# Patient Record
Sex: FEMALE | Race: WHITE | NOT HISPANIC OR LATINO | Employment: OTHER | URBAN - METROPOLITAN AREA
[De-identification: names, ages, dates, MRNs, and addresses within clinical notes are randomized per-mention and may not be internally consistent; named-entity substitution may affect disease eponyms.]

---

## 2017-11-01 ENCOUNTER — OFFICE VISIT (OUTPATIENT)
Dept: ORTHOPEDIC SURGERY | Facility: CLINIC | Age: 52
End: 2017-11-01

## 2017-11-01 VITALS
WEIGHT: 242 LBS | HEIGHT: 66 IN | DIASTOLIC BLOOD PRESSURE: 84 MMHG | HEART RATE: 67 BPM | BODY MASS INDEX: 38.89 KG/M2 | SYSTOLIC BLOOD PRESSURE: 141 MMHG

## 2017-11-01 DIAGNOSIS — M25.551 RIGHT HIP PAIN: Primary | ICD-10-CM

## 2017-11-01 DIAGNOSIS — M25.512 LEFT SHOULDER PAIN, UNSPECIFIED CHRONICITY: ICD-10-CM

## 2017-11-01 PROCEDURE — 99203 OFFICE O/P NEW LOW 30 MIN: CPT | Performed by: ORTHOPAEDIC SURGERY

## 2017-11-01 RX ORDER — LEVOTHYROXINE SODIUM 0.03 MG/1
TABLET ORAL
Refills: 3 | COMMUNITY
Start: 2017-08-14

## 2017-11-01 NOTE — PROGRESS NOTES
[facility] – [place] [facility] Note    Subjective     Chief Complaint   Patient presents with   • Right Hip - Pain   • Left Shoulder - Pain        HPI    Julia Howard is a 52 y.o. female. She presents today for 2 different problems.  One involves her right hip and the other her left shoulder.  Of the 2 her left shoulder bothers her more.    Left shoulder pain is been present now for 3 months, following no particular injury.  Bothers her when she sleeps on it at night, as well as doing overhead activities.  The patient has been worsening.  The pain is aching, and burning.  Pain is moderate to severe.      Right hip pain has been present now for at least 10 years, following no particular injury.  The pain is moderate to severe, aching in quality, worsens with walking, and has been slowly worsening over the past 10 years.  She occasionally has giving way.      There is no problem list on file for this patient.    History reviewed. No pertinent past medical history.   Past Surgical History:   Procedure Laterality Date   • HYSTERECTOMY  04/2017      Family History   Problem Relation Age of Onset   • No Known Problems Mother    • No Known Problems Father    • No Known Problems Sister    • No Known Problems Brother      Social History     Social History   • Marital status: Unknown     Spouse name: N/A   • Number of children: N/A   • Years of education: N/A     Occupational History   • Not on file.     Social History Main Topics   • Smoking status: Never Smoker   • Smokeless tobacco: Never Used   • Alcohol use Yes   • Drug use: No   • Sexual activity: Defer     Other Topics Concern   • Not on file     Social History Narrative   • No narrative on file      No current outpatient prescriptions on file prior to visit.     No current facility-administered medications on file prior to visit.       No Known Allergies     Review of Systems   Constitutional: Negative for activity change, appetite change, chills, diaphoresis,  "fatigue, fever and unexpected weight change.   HENT: Negative for congestion, dental problem, drooling, ear discharge, ear pain, facial swelling, hearing loss, mouth sores, nosebleeds, postnasal drip, rhinorrhea, sinus pressure, sneezing, sore throat, tinnitus, trouble swallowing and voice change.    Eyes: Negative for photophobia, pain, discharge, redness, itching and visual disturbance.   Respiratory: Negative for apnea, cough, choking, chest tightness, shortness of breath, wheezing and stridor.    Cardiovascular: Negative for chest pain, palpitations and leg swelling.   Gastrointestinal: Negative for abdominal distention, abdominal pain, anal bleeding, blood in stool, constipation, diarrhea, nausea, rectal pain and vomiting.   Endocrine: Negative for cold intolerance, heat intolerance, polydipsia, polyphagia and polyuria.   Genitourinary: Negative for decreased urine volume, difficulty urinating, dysuria, enuresis, flank pain, frequency, genital sores, hematuria and urgency.   Musculoskeletal: Positive for arthralgias. Negative for back pain, gait problem, joint swelling, myalgias, neck pain and neck stiffness.   Skin: Negative for color change, pallor, rash and wound.   Allergic/Immunologic: Negative for environmental allergies, food allergies and immunocompromised state.   Neurological: Negative for dizziness, tremors, seizures, syncope, facial asymmetry, speech difficulty, weakness, light-headedness, numbness and headaches.   Hematological: Negative for adenopathy. Does not bruise/bleed easily.   Psychiatric/Behavioral: Negative for agitation, behavioral problems, confusion, decreased concentration, dysphoric mood, hallucinations, self-injury, sleep disturbance and suicidal ideas. The patient is not nervous/anxious and is not hyperactive.         Objective      Physical Exam  /84  Pulse 67  Ht 66.14\" (168 cm)  Wt 242 lb (110 kg)  BMI 38.89 kg/m2    Body mass index is 38.89 " kg/(m^2).    General:   Mental Status:  Alert   Appearance: Cooperative, in no acute distress   Build and Nutrition: Overweight female   Orientation: Alert and oriented to person, place and time   Posture: Normal   Gait: Normal    Integument:   Left shoulder: No skin lesions, no rash, no ecchymosis    Neurologic:   Sensation:    Left hand: Intact to light touch in the digits   Motor:  Left upper extremity: 5/5 deltoid, biceps, triceps, wrist flexors, wrist extensors, and interossei  Vascular:   Left upper extremity: 2+ dorsalis radial pulse, prompt capillary refill    Upper Extremities:   Left Shoulder:    Tenderness:  None    Swelling:  None    Crepitus: None    Atrophy:  None    Range of motion:  External rotation:  60°       Forward flexion:  170°       Abduction:   160°  Instability:  None  Deformities:  None  Functional testing: Negative drop arm, negative lift-off, positive impingement    Neurologic:   Sensation:    Right foot: Intact to light touch on the dorsal and plantar aspect   Motor:  Right lower extremity: 5/5 quadriceps, hamstrings, ankle dorsiflexors, and ankle plantar flexors    Vascular:   Right lower extremity: 2+ dorsalis pedis pulse, prompt capillary refill    Lower Extremities:   Right Hip:    Tenderness:  None    Swelling: None    Crepitus:  None    Atrophy:  None    Range of motion:  External Rotation: 30°       Internal Rotation: 30°       Flexion:  100°       Extension:  0°   Instability:  None  Deformities:  None  Functional testing:  [facility]    No leg length discrepancy      Imaging/Studies  Imaging Results (last 24 hours)     Procedure Component Value Units Date/Time    XR Shoulder 2+ View Left [[id number]] Resulted:  11/01/17 0900     Updated:  11/01/17 0901    Narrative:       Left Shoulder Radiographs  Indication: left shoulder pain  Views: AP, outlet and axillary views of the left shoulder    Comparison: no prior studies available for review    Findings:  No acute or chronic bony  abnormalities seen, with normal alignment.      XR Hip With or Without Pelvis 1 View Right [[id number]] Resulted:  11/01/17 0901     Updated:  11/01/17 0902    Narrative:       Right Hip Radiographs  Indication: right hip pain  Views: low AP pelvis and lateral of the right hip    Comparison: no prior studies available for review    Findings:   No acute bony abnormalities.  Uncovering of the femoral head laterally,   with mild arthritic changes.            Assessment and Plan     Julia was seen today for pain and pain.    Diagnoses and all orders for this visit:    Right hip pain  -     XR Hip With or Without Pelvis 1 View Right    Left shoulder pain, unspecified chronicity  -     XR Shoulder 2+ View Left        I reviewed my findings with patient today.  Regarding her left shoulder, she has signs of impingement, and no improvement with physical therapy.  At this point, recommended an MRI, specifically looking at the rotator cuff.  I will see her back after the MRI, but sooner for any problems.    With regards to her right hip, she does have signs of arthritis, and I've recommended continued conservative treatment.  If her pain worsens future, we may consider an intra-articular injection.  We may also consider hip replacement surgery in the future if she has worsening symptoms.    No Follow-up on file.      Medical Decision Making    Data/Risk: radiology tests and independent visualization of imaging, lab tests, or EMG/NCV      Wayne Danielle MD  11/01/17  9:25 AM

## 2017-11-13 ENCOUNTER — TELEPHONE (OUTPATIENT)
Dept: ORTHOPEDIC SURGERY | Facility: CLINIC | Age: 52
End: 2017-11-13

## 2017-11-13 DIAGNOSIS — G89.29 CHRONIC LEFT SHOULDER PAIN: Primary | ICD-10-CM

## 2017-11-13 DIAGNOSIS — M25.512 CHRONIC LEFT SHOULDER PAIN: Primary | ICD-10-CM

## 2017-11-13 NOTE — TELEPHONE ENCOUNTER
You recommended pt to get MRI in last OV, however, no order was placed. Please put order in system. Thanks!

## 2017-11-13 NOTE — TELEPHONE ENCOUNTER
PATIENT CALLED IN REGARDS TO A MRI SHE WAS TO HAVE SCHEDULED.   I DO NOT SEE WHERE THERE WAS AN ORDER PLACED FOR MRI, WAS PATIENT TO HAVE MRI?  IF NOT PLEASE CALL PATIENT AND DISCUSS AS SHE THOUGHT SHE WAS TO HAVE.  HER NUMBER IS [phone]

## 2017-11-14 ENCOUNTER — HOSPITAL ENCOUNTER (OUTPATIENT)
Dept: MRI IMAGING | Facility: HOSPITAL | Age: 52
Discharge: HOME | End: 2017-11-14
Attending: ORTHOPAEDIC SURGERY | Admitting: ORTHOPAEDIC SURGERY

## 2017-11-14 DIAGNOSIS — M25.512 CHRONIC LEFT SHOULDER PAIN: ICD-10-CM

## 2017-11-14 DIAGNOSIS — G89.29 CHRONIC LEFT SHOULDER PAIN: ICD-10-CM

## 2017-11-14 PROCEDURE — 73221 MRI JOINT UPR EXTREM W/O DYE: CPT

## 2017-11-15 ENCOUNTER — OFFICE VISIT (OUTPATIENT)
Dept: ORTHOPEDIC SURGERY | Facility: CLINIC | Age: 52
End: 2017-11-15

## 2017-11-15 VITALS
HEART RATE: 74 BPM | HEIGHT: 66 IN | DIASTOLIC BLOOD PRESSURE: 75 MMHG | BODY MASS INDEX: 38.89 KG/M2 | WEIGHT: 242 LBS | SYSTOLIC BLOOD PRESSURE: 134 MMHG

## 2017-11-15 DIAGNOSIS — IMO0002 ROTATOR CUFF SYNDROME OF LEFT SHOULDER AND ALLIED DISORDERS: Primary | ICD-10-CM

## 2017-11-15 PROCEDURE — 20610 DRAIN/INJ JOINT/BURSA W/O US: CPT | Performed by: ORTHOPAEDIC SURGERY

## 2017-11-15 PROCEDURE — 99214 OFFICE O/P EST MOD 30 MIN: CPT | Performed by: ORTHOPAEDIC SURGERY

## 2017-11-15 RX ORDER — LIDOCAINE HYDROCHLORIDE 10 MG/ML
4 INJECTION, SOLUTION INFILTRATION; PERINEURAL
Status: COMPLETED | OUTPATIENT
Start: 2017-11-15 | End: 2017-11-15

## 2017-11-15 RX ORDER — TRIAMCINOLONE ACETONIDE 40 MG/ML
40 INJECTION, SUSPENSION INTRA-ARTICULAR; INTRAMUSCULAR
Status: COMPLETED | OUTPATIENT
Start: 2017-11-15 | End: 2017-11-15

## 2017-11-15 RX ADMIN — LIDOCAINE HYDROCHLORIDE 4 ML: 10 INJECTION, SOLUTION INFILTRATION; PERINEURAL at 15:20

## 2017-11-15 RX ADMIN — TRIAMCINOLONE ACETONIDE 40 MG: 40 INJECTION, SUSPENSION INTRA-ARTICULAR; INTRAMUSCULAR at 15:20

## 2017-11-15 NOTE — PROGRESS NOTES
[facility] [place] [facility] [place]    Subjective     Chief Complaint   Patient presents with   • Left Shoulder - Follow-up     Post MRI         HPI    Julia Howard is a 52 y.o. female. Follows up today for her MRI of the left shoulder.  New complaints today.  Pain is moderate in severity, aching in quality, unchanged from her last visit.      There is no problem list on file for this patient.    History reviewed. No pertinent past medical history.   Past Surgical History:   Procedure Laterality Date   • HYSTERECTOMY  04/2017      Family History   Problem Relation Age of Onset   • No Known Problems Mother    • No Known Problems Father    • No Known Problems Sister    • No Known Problems Brother      Social History     Social History   • Marital status: Unknown     Spouse name: N/A   • Number of children: N/A   • Years of education: N/A     Occupational History   • Not on file.     Social History Main Topics   • Smoking status: Never Smoker   • Smokeless tobacco: Never Used   • Alcohol use Yes   • Drug use: No   • Sexual activity: Defer     Other Topics Concern   • Not on file     Social History Narrative      Current Outpatient Prescriptions on File Prior to Visit   Medication Sig Dispense Refill   • levothyroxine (SYNTHROID, LEVOTHROID) 25 MCG tablet TAKE 1 TABLET DAILY AS DIRECTED.  3     No current facility-administered medications on file prior to visit.       No Known Allergies     Review of Systems   Constitutional: Negative for activity change, appetite change, chills, diaphoresis, fatigue, fever and unexpected weight change.   HENT: Negative for congestion, dental problem, drooling, ear discharge, ear pain, facial swelling, hearing loss, mouth sores, nosebleeds, postnasal drip, rhinorrhea, sinus pressure, sneezing, sore throat, tinnitus, trouble swallowing and voice change.    Eyes: Negative for photophobia, pain, discharge, redness, itching and visual disturbance.   Respiratory: Negative for apnea,  "cough, choking, chest tightness, shortness of breath, wheezing and stridor.    Cardiovascular: Negative for chest pain, palpitations and leg swelling.   Gastrointestinal: Negative for abdominal distention, abdominal pain, anal bleeding, blood in stool, constipation, diarrhea, nausea, rectal pain and vomiting.   Endocrine: Negative for cold intolerance, heat intolerance, polydipsia, polyphagia and polyuria.   Genitourinary: Negative for decreased urine volume, difficulty urinating, dysuria, enuresis, flank pain, frequency, genital sores, hematuria and urgency.   Musculoskeletal: Negative for arthralgias, back pain, gait problem, joint swelling, myalgias, neck pain and neck stiffness.        Joint Pain    Skin: Negative for color change, pallor, rash and wound.   Allergic/Immunologic: Negative for environmental allergies, food allergies and immunocompromised state.   Neurological: Negative for dizziness, tremors, seizures, syncope, facial asymmetry, speech difficulty, weakness, light-headedness, numbness and headaches.   Hematological: Negative for adenopathy. Does not bruise/bleed easily.   Psychiatric/Behavioral: Negative for agitation, behavioral problems, confusion, decreased concentration, dysphoric mood, hallucinations, self-injury, sleep disturbance and suicidal ideas. The patient is not nervous/anxious and is not hyperactive.         Objective      Physical Exam  /75  Pulse 74  Ht 66.14\" (168 cm)  Wt 242 lb (110 kg)  BMI 38.89 kg/m2    Body mass index is 38.89 kg/(m^2).    General:   Mental Status:  Alert   Appearance: Cooperative, in no acute distress   Build and Nutrition: Well-nourished and well developed female   Orientation: Alert and oriented to person, place and time   Posture: Normal   Gait: Normal    Integument:   Left shoulder: No skin lesions, no rash, no ecchymosis    Upper Extremities:   Left Shoulder:    Tenderness:  None    Swelling:  None    Range of motion:  External rotation: "  60°       Forward flexion:  160°       Abduction:   160°  Deformities:  None  Functional testing: Negative drop arm, negative lift-off, positive impingement      Imaging/Studies  MRI left shoulder:  IMPRESSION:  1. There is full-thickness abnormal signal indicating tear and  degeneration of the rotator cuff tendons combined with a small rim rent  tear at the attachment of the rotator cuff tendons on the greater  tuberosity. This is substantial full-thickness disease of the rotator  cuff without cuff retraction.  2. Biceps anchor, superior labrum, anterior labrum and posterior labrum  are intact.  3. Inflammatory arthropathy left AC joint is noted without high-grade  superior arch impingement.  4. Other findings as noted above.      Assessment and Plan     Julia was seen today for follow-up.    Diagnoses and all orders for this visit:    Rotator cuff syndrome of left shoulder and allied disorders  -     Ambulatory Referral to Physical Therapy Evaluate and treat  -     Large Joint Arthrocentesis        I reviewed my findings with patient today.  Her left shoulder continues to bother her, and her MRI seems to suggest small rotator cuff tear.  I have offered her a trial of a subacromial injection and exercises first.  Should she have no long-term improvement, she may be a candidate for surgical intervention.  I will see her back in 2 months, but sooner for any problems.    She had 60% relief just a few minutes following the injection.    Return in about 2 months (around 1/15/2018).      Medical Decision Making  Management Options : prescription/IM medicine and physical/occupational therapy  Data/Risk: independent visualization of imaging, lab tests, or EMG/NCV      Wayne Danielle MD  11/15/17  3:30 PM

## 2017-11-15 NOTE — PROGRESS NOTES
Procedure   Large Joint Arthrocentesis  Date/Time: 11/15/2017 3:20 PM  Consent given by: patient  Site marked: site marked  Timeout: Immediately prior to procedure a time out was called to verify the correct patient, procedure, equipment, support staff and site/side marked as required   Supporting Documentation  Indications: pain   Procedure Details  Location: shoulder - L subacromial bursa  Needle size: 22 G  Approach: posterior  Medications administered: 4 mL lidocaine 1 %; 40 mg triamcinolone acetonide 40 MG/ML

## 2017-11-20 ENCOUNTER — HOSPITAL ENCOUNTER (OUTPATIENT)
Dept: PHYSICAL THERAPY | Facility: HOSPITAL | Age: 52
Setting detail: THERAPIES SERIES
Discharge: HOME | End: 2017-11-20
Attending: ORTHOPAEDIC SURGERY

## 2017-11-20 DIAGNOSIS — M25.512 ACUTE PAIN OF LEFT SHOULDER: Primary | ICD-10-CM

## 2017-11-20 PROCEDURE — 97110 THERAPEUTIC EXERCISES: CPT | Performed by: PHYSICAL THERAPIST

## 2017-11-20 PROCEDURE — 97161 PT EVAL LOW COMPLEX 20 MIN: CPT | Performed by: PHYSICAL THERAPIST

## 2017-11-20 NOTE — THERAPY EVALUATION
"    Outpatient Physical Therapy Ortho Initial Evaluation  [facility]     Patient Name: Julia Howard  : 1965  MRN: [id number]  Today's Date: 2017      Visit Date: 2017    There is no problem list on file for this patient.       No past medical history on file.     Past Surgical History:   Procedure Laterality Date   • HYSTERECTOMY  2017       Visit Dx:     ICD-10-CM ICD-9-CM   1. Acute pain of left shoulder M25.512 719.41             Patient History       17 1500          History    Chief Complaint Pain  -ALFREDO      Type of Pain Shoulder pain  -ALFREDO      Date Current Problem(s) Began 17  -ALFREDO      Brief Description of Current Complaint Patient reports that she has a more recent onset of left shoulder pain of unknown cause.  Notes the pain has been present approximately 2 months.  She states that she sleeps on her left side frequently with her arm above her which she attributes as a possible source of the pain.  Pain is more along the superior shoulder and is generally \"okay\" throughout the day but is worse when laying down at night.  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  hobbies: recreational exercise  -ALFREDO      How has patient tried to help current problem? injection, rest  -ALFREDO      What clinical tests have you had for this problem? X-ray  -ALFREDO      Results of Clinical Tests partial vs full thickness tear of the supraspinatus  -ALFREDO      Pain     Pain Location Shoulder  -ALFREDO      Pain at Present 0  -ALFREDO      Pain at Best 0  -ALFREDO      Pain at Worst 6   8 prior to injection  -ALFREDO      Pain Frequency Intermittent  -ALFREDO      Pain Description Dull;Discomfort;Sore  -ALFREDO      What Performance Factors Make the Current Problem(s) WORSE? laying on the shoulder " in flexed position  -ALFREDO      What Performance Factors Make the Current Problem(s) BETTER? rest and avoidance  -ALFREDO      Difficulties at work? none  -ALFREDO      Difficulties with ADL's? upper body dressing, haircare  -ALFREDO      Difficulties with recreational activities? recreational exercise  -ALFREDO      Fall Risk Assessment    Any falls in the past year: No  -ALFREDO      Daily Activities    Primary Language English  -ALFREDO      Pt Participated in POC and Goals Yes  -ALFREDO      Safety    Are you being hurt, hit, or frightened by anyone at home or in your life? No  -ALFREDO        User Key  (r) = Recorded By, (t) = Taken By, (c) = Cosigned By    Initials Name Provider Type    ALFREDO Yasmani Siu, PT Physical Therapist                PT Ortho       11/20/17 1600    Posture/Observations    Posture/Observations Comments fwd head posture with mild rounded shoulders.  -ALFREDO    Special Tests/Palpation    Special Tests/Palpation Shoulder  -ALFREDO    Shoulder Girdle Palpation    Subacromial Space Left:;Tender  -ALFREDO    Supraspinatus Insertion --   nontender  -ALFREDO    Long Head of Biceps Left:;Guarded/taut  -ALFREDO    Upper Trap Bilateral:;Guarded/taut  -ALFREDO    Shoulder Girdle Palpation? Yes  -ALFREDO    Shoulder Impingement/Rotator Cuff Special Tests    Goldberg-Gamaliel Test (RC Lesion vs. Bursitis) Left:;Negative  -ALFREDO    Neer Impingement Test (RC Lesion vs. Bursitis) Left:;Positive  -ALFREDO    Full Can Test (RC Lesion) Left:;Positive   weak and painful  -ALFREDO    Drop Arm Test (Full Thickness RC Lesion) Negative  -ALFREDO    Shoulder Laxity/Instability Special Tests    Posterior Drawer Sign Negative  -ALFREDO    Biceps/Labral Special Tests    Harlingen's Test (Labral Test) Left:;Positive  -ALFREDO    Biceps II (Biceps Tendinopathy vs. Labral Tear) Negative  -ALFREDO    ROM (Range of Motion)    General ROM Detail left shoulder flexion 135, . Pain above 100 for both.  Hand behind head to C7 with pain, c3/4 without, HBB to L1 without pain  -ALFREDO    MMT (Manual Muscle Testing)    General MMT  Assessment upper extremity strength deficits identified  -ALFREDO    Left Shoulder    Flexion Gross Movement (4/5) good   pain  -ALFREDO    Abduction Middle Deltoid (4+/5) good plus  -ALFREDO    Abduction Supraspinatus (3+/5) fair plus   pain  -ALFREDO    Int Rotation Gross Movement (5/5) normal  -ALFREDO    Ext Rotation Gross Movement (3+/5) fair plus   pain  -ALFREDO    External Rotation Infraspinatus (4+/5) good plus   less painful  -ALFREDO    Left Scapula    Scapular ADduction Middle Trapezius (4/5) good  -ALFREDO    Scapular ADd&Depression Lwr Trapezius (3/5) fair  -ALFREDO    Upper Extremity    Upper Ext Manual Muscle Testing left shoulder strength deficit;left scapular strength deficit;left elbow/forearm strength deficit  -ALFREDO    Left Elbow/Forearm    Elbow Flexion Gross Movement (5/5) normal  -ALFREDO    Triceps Brachii Elbow Extension (5/5) normal  -ALFREDO      User Key  (r) = Recorded By, (t) = Taken By, (c) = Cosigned By    Initials Name Provider Type    ALFREDO Siu, PT Physical Therapist                            Therapy Education       11/20/17 1622          Therapy Education    Education Details initiated HEP per exercise flowsheet  -ALFREDO      Given HEP  -ALFREDO      Program New  -ALFREDO      How Provided Demonstration;Written;Verbal  -ALFREDO      Provided to Patient  -ALFREDO      Level of Understanding Verbalized;Demonstrated  -ALFREDO        User Key  (r) = Recorded By, (t) = Taken By, (c) = Cosigned By    Initials Name Provider Type    ALFREDO Siu, PT Physical Therapist                PT OP Goals       11/20/17 1600       PT Short Term Goals    STG Date to Achieve 12/04/17  -ALFREDO     STG 1 Patient to maintain pn 0/10 pre treatment  -ALFREDO     STG 1 Progress New  -ALFREDO     STG 2 Patient to acheive hand hebhind head to C7 with minimal pain  -ALFREDO     STG 2 Progress New  -ALFREDO     Long Term Goals    LTG Date to Achieve 12/18/17  -ALFREDO     LTG 1 Patient to be independent with final HEP  -ALFREDO     LTG 1 Progress New  -ALFREDO     LTG 2 Patient to demonstrate middle trap strength at  least 4+/5  -ALFREDO     LTG 2 Progress New  -ALFREDO     LTG 3 Patient to demonstrate pain free ABD to at least 125  -ALFREDO     LTG 3 Progress New  -ALFREDO     LTG 4 Patient to demonstrate pain free flexion to at least 125  -ALFREDO     LTG 4 Progress New  -ALFREDO     LTG 5 Patient to improve Quick Dash to at least 16%  -ALFREDO     LTG 5 Progress New  -ALFREDO     Time Calculation    PT Goal Re-Cert Due Date 02/18/18  -ALFREDO       User Key  (r) = Recorded By, (t) = Taken By, (c) = Cosigned By    Initials Name Provider Type    ALFREDO Siu, PT Physical Therapist                PT Assessment/Plan       11/20/17 1600       PT Assessment    Functional Limitations Performance in self-care ADL;Performance in leisure activities;Limitation in home management  -ALFREDO     Impairments Range of motion;Posture;Poor body mechanics;Pain;Muscle strength  -ALFREDO     Assessment Comments Patient presents with insidious left shoulder pain with clinical and MRI signs of partial thickness RTC tear.  She demonstrates near functional but painful ROM and irritatbility in shoulder elevated positions.  Pain interferes with ADLs and home management as well as leisure activities and have generally improved with injection last week.  -ALFREDO     Please refer to paper survey for additional self-reported information Yes  -ALFREDO     Rehab Potential Good  -ALFREDO     Patient/caregiver participated in establishment of treatment plan and goals Yes  -ALFREDO     Patient would benefit from skilled therapy intervention Yes  -ALFREDO     PT Plan    PT Frequency 2x/week  -ALFREDO     Predicted Duration of Therapy Intervention (days/wks) 8 visits  -ALFREDO     Planned CPT's? PT EVAL LOW COMPLEXITY: 90583;PT THER PROC EA 15 MIN: 06500;PT MANUAL THERAPY EA 15 MIN: 68852;PT ELECTRICAL STIM UNATTEND: ;PT ULTRASOUND EA 15 MIN: 20841;PT HOT/COLD PACK WC NONMCARE: 40048  -ALFREDO     PT Plan Comments postural exercises, RTC/Deltoid strengthening, scapular stabilization, modalities and manual techniques as indicated. Modalities prn.   -ALFREDO       User Key  (r) = Recorded By, (t) = Taken By, (c) = Cosigned By    Initials Name Provider Type    ALFREDO Siu, PT Physical Therapist                  Exercises       11/20/17 1600          Exercise 2    Exercise Name 2 supine wand flexion  -ALFREDO      Reps 2 10  -ALFREDO      Exercise 3    Exercise Name 3 S/L sleeper stretch  -ALFREDO      Sets 3 3  -ALFREDO      Time (Seconds) 3 30  -ALFREDO      Exercise 4    Exercise Name 4 S/L cross arm add stretch  -ALFREDO      Sets 4 3  -ALFREDO      Time (Seconds) 4 30  -ALFREDO        User Key  (r) = Recorded By, (t) = Taken By, (c) = Cosigned By    Initials Name Provider Type    ALFREDO Siu, PT Physical Therapist                              Time Calculation:   Start Time: 1500  Total Timed Code Minutes- PT: 11 minute(s)     Therapy Charges for Today     Code Description Service Date Service Provider Modifiers Qty    25881543888  PT EVAL LOW COMPLEXITY 3 11/20/2017 Yasmani Siu, PT GP 1    [id number]  PT THER PROC EA 15 MIN 11/20/2017 Yasmani Siu, PT GP 1                    Yasmani Siu, PT  11/20/2017

## 2017-11-27 ENCOUNTER — HOSPITAL ENCOUNTER (OUTPATIENT)
Dept: PHYSICAL THERAPY | Facility: HOSPITAL | Age: 52
Setting detail: THERAPIES SERIES
Discharge: HOME | End: 2017-11-27
Attending: ORTHOPAEDIC SURGERY

## 2017-11-27 DIAGNOSIS — M25.512 ACUTE PAIN OF LEFT SHOULDER: Primary | ICD-10-CM

## 2017-11-27 PROCEDURE — 97110 THERAPEUTIC EXERCISES: CPT | Performed by: PHYSICAL THERAPIST

## 2017-11-27 NOTE — THERAPY TREATMENT NOTE
Outpatient Physical Therapy Ortho Treatment Note  [facility]     Patient Name: Julia Howard  : 1965  MRN: [id number]  Today's Date: 2017      Visit Date: 2017    Visit Dx:    ICD-10-CM ICD-9-CM   1. Acute pain of left shoulder M25.512 719.41       There is no problem list on file for this patient.       No past medical history on file.     Past Surgical History:   Procedure Laterality Date   • HYSTERECTOMY  2017                             PT Assessment/Plan       17 1700       PT Assessment    Assessment Comments Patient tolerates all exercises without increased pain.  -ALFREDO     PT Plan    PT Plan Comments progress as tolerated, attempt exercise class to modify.  -ALFREDO       User Key  (r) = Recorded By, (t) = Taken By, (c) = Cosigned By    Initials Name Provider Type    ALFREDO Siu, PT Physical Therapist                    Exercises       17 1700          Subjective Comments    Subjective Comments She states that she continues to feel pretty well.  She continues to have pain when in certain positions  -ALFREDO      Subjective Pain    Able to rate subjective pain? yes  -ALFREDO      Pre-Treatment Pain Level 0  -ALFREDO      Post-Treatment Pain Level 0  -ALFREDO      Exercise 3    Exercise Name 3 S/L sleeper stretch  -ALFREDO      Sets 3 3  -ALFREDO      Time (Seconds) 3 30  -ALFREDO      Exercise 4    Exercise Name 4 S/L cross arm add stretch  -ALFREDO      Sets 4 3  -ALFREDO      Time (Seconds) 4 30  -ALFREDO      Exercise 5    Exercise Name 5 middle/low rows red band  -ALFREDO      Reps 5 15  -ALFREDO      Exercise 6    Exercise Name 6 no money (no bands due to pain)  -ALFREDO      Reps 6 15  -ALFREDO      Exercise 7    Exercise Name 7 band ER/IR/flex/ext  -ALFREDO      Sets 7 2  -ALFREDO      Reps 7 12  -ALFREDO      Exercise 8    Exercise Name 8 full can red band  -ALFREDO      Reps 8 12  -ALFREDO        User Key  (r) = Recorded By, (t) = Taken By, (c) = Cosigned By    Initials Name Provider Type    ALFREDO Siu, PT Physical Therapist                                    Therapy Education       11/27/17 4333          Therapy Education    Education Details added band exercises to HEP per flowsheet  -ALFREDO      Given HEP  -ALFREDO      Program New  -ALFREDO      How Provided Verbal;Demonstration;Written  -ALFREDO      Provided to Patient  -ALFREDO      Level of Understanding Verbalized;Demonstrated  -ALFREDO        User Key  (r) = Recorded By, (t) = Taken By, (c) = Cosigned By    Initials Name Provider Type    ALFREDO Siu, PT Physical Therapist                Time Calculation:   Start Time: 1645  Total Timed Code Minutes- PT: 30 minute(s)    Therapy Charges for Today     Code Description Service Date Service Provider Modifiers Qty    66428415410  PT THER PROC EA 15 MIN 11/27/2017 Yasmani Siu, PT GP 2                    Yasmani Siu, PT  11/27/2017

## 2017-12-05 ENCOUNTER — HOSPITAL ENCOUNTER (OUTPATIENT)
Dept: PHYSICAL THERAPY | Facility: HOSPITAL | Age: 52
Setting detail: THERAPIES SERIES
Discharge: HOME | End: 2017-12-05
Attending: ORTHOPAEDIC SURGERY

## 2017-12-05 DIAGNOSIS — M25.512 ACUTE PAIN OF LEFT SHOULDER: Primary | ICD-10-CM

## 2017-12-05 PROCEDURE — 97110 THERAPEUTIC EXERCISES: CPT | Performed by: PHYSICAL THERAPIST

## 2017-12-05 NOTE — THERAPY TREATMENT NOTE
Outpatient Physical Therapy Ortho Treatment Note  [facility]     Patient Name: Julia Howard  : 1965  MRN: [id number]  Today's Date: 2017      Visit Date: 2017    Visit Dx:    ICD-10-CM ICD-9-CM   1. Acute pain of left shoulder M25.512 719.41       There is no problem list on file for this patient.       No past medical history on file.     Past Surgical History:   Procedure Laterality Date   • HYSTERECTOMY  2017                             PT Assessment/Plan       17 0800       PT Assessment    Assessment Comments Patient is able to replicate gym activities without pain.  She is presenting with much reduced pain throughout and between sessions.  -ALFREDO     PT Plan    PT Plan Comments decrease frequency 1x/wk and continue with functional progression.  -ALFREDO       User Key  (r) = Recorded By, (t) = Taken By, (c) = Cosigned By    Initials Name Provider Type    ALFREDO Siu, PT Physical Therapist                    Exercises       17 0800          Subjective Comments    Subjective Comments She states that her shoulder has been doing well, she has been really busy and hasn't noticed a lot of issues.  -ALFREDO      Subjective Pain    Able to rate subjective pain? yes  -ALFREDO      Pre-Treatment Pain Level 0  -ALFREDO      Post-Treatment Pain Level 0  -ALFREDO      Exercise 2    Exercise Name 2 plank review: low/high  -ALFREDO      Time (Minutes) 2 5  -ALFREDO      Exercise 3    Exercise Name 3 shoulder ER at 70 ABD  -ALFREDO      Reps 3 10  -ALFREDO      Exercise 5    Exercise Name 5 middle/low rows at CC 4 pl  -ALFREDO      Reps 5 12  -ALFREDO      Exercise 6    Exercise Name 6 lat pull at CC 4pl  -ALFREDO      Reps 6 12  -ALFREDO      Exercise 7    Exercise Name 7 wall plank with scapular retraction  -ALFREDO      Sets 7 2  -ALFREDO      Reps 7 10  -ALFREDO      Exercise 8    Exercise Name 8 bent over row 5#  -ALFREDO      Reps 8 12  -ALFREDO        User Key  (r) = Recorded By, (t) = Taken By, (c) = Cosigned By    Initials Name Provider Type    ALFREDO SNYDER  Salbador, PT Physical Therapist                                       Time Calculation:   Start Time: 0805  Total Timed Code Minutes- PT: 40 minute(s)    Therapy Charges for Today     Code Description Service Date Service Provider Modifiers Qty    [id number]  PT THER PROC EA 15 MIN 12/5/2017 Yasmani Siu, PT GP 3                    Yasmani Siu, PT  12/5/2017

## 2017-12-11 ENCOUNTER — HOSPITAL ENCOUNTER (OUTPATIENT)
Dept: PHYSICAL THERAPY | Facility: HOSPITAL | Age: 52
Setting detail: THERAPIES SERIES
Discharge: HOME | End: 2017-12-11
Attending: ORTHOPAEDIC SURGERY

## 2017-12-11 DIAGNOSIS — M25.512 ACUTE PAIN OF LEFT SHOULDER: Primary | ICD-10-CM

## 2017-12-11 PROCEDURE — 97110 THERAPEUTIC EXERCISES: CPT | Performed by: PHYSICAL THERAPIST

## 2017-12-11 NOTE — THERAPY TREATMENT NOTE
Outpatient Physical Therapy Ortho Treatment Note  [facility]     Patient Name: Julia Howard  : 1965  MRN: [id number]  Today's Date: 2017      Visit Date: 2017    Visit Dx:    ICD-10-CM ICD-9-CM   1. Acute pain of left shoulder M25.512 719.41       There is no problem list on file for this patient.       No past medical history on file.     Past Surgical History:   Procedure Laterality Date   • HYSTERECTOMY  2017                             PT Assessment/Plan       17 1800       PT Assessment    Assessment Comments Patient is challeneged with shoulder stability in elevated positions.  She is demonstrating improved pain and irritability.  -ALFREDO     PT Plan    PT Plan Comments add low trap slides facing wall with band  -ALFREDO       User Key  (r) = Recorded By, (t) = Taken By, (c) = Cosigned By    Initials Name Provider Type    ALFREDO Siu, PT Physical Therapist                    Exercises       17 1500          Subjective Comments    Subjective Comments She states that she is feeling great.  She went to a cycle class today and wasn't limited.  -ALFREDO      Subjective Pain    Able to rate subjective pain? yes  -ALFREDO      Pre-Treatment Pain Level 0  -ALFREDO      Exercise 1    Exercise Name 1 UBE 1.0  -ALFREDO      Time (Minutes) 1 4  -ALFREDO      Exercise 2    Exercise Name 2 ball drills: scaption dribble at 90, circles CW/CCW at 90 flex, RS at end range elevation/abd, CW/CCW cirlces a 90 ABD  -ALFREDO      Time (Minutes) 2 12  -ALFREDO      Exercise 3    Exercise Name 3 body blade flex/abd/oblique, bilateral in flexion to fatigue each x 3  -ALFREDO      Exercise 4    Exercise Name 4 robbery at 90 ABD yellow  -ALFREDO      Reps 4 15  -ALFREDO      Exercise 5    Exercise Name 5 middle/low rows at CC 4 pl  -ALFREDO      Reps 5 15  -ALFREDO      Exercise 7    Exercise Name 7 wall plank with scapular retraction  -ALFREDO      Sets 7 2  -ALFREDO      Reps 7 10  -ALFREDO        User Key  (r) = Recorded By, (t) = Taken By, (c) = Cosigned By     Initials Name Provider Type    ALFREDO Yasmani Siu, PT Physical Therapist                                       Time Calculation:   Start Time: 1544  Total Timed Code Minutes- PT: 39 minute(s)    Therapy Charges for Today     Code Description Service Date Service Provider Modifiers Qty    54944871255 HC PT THER PROC EA 15 MIN 12/11/2017 Yasmani Siu, PT GP 3                    Yasmani Siu, PT  12/11/2017

## 2017-12-21 ENCOUNTER — HOSPITAL ENCOUNTER (OUTPATIENT)
Dept: PHYSICAL THERAPY | Facility: HOSPITAL | Age: 52
Setting detail: THERAPIES SERIES
Discharge: HOME | End: 2017-12-21
Attending: ORTHOPAEDIC SURGERY

## 2017-12-21 DIAGNOSIS — M25.512 ACUTE PAIN OF LEFT SHOULDER: Primary | ICD-10-CM

## 2017-12-21 PROCEDURE — 97110 THERAPEUTIC EXERCISES: CPT | Performed by: PHYSICAL THERAPIST

## 2017-12-21 NOTE — THERAPY PROGRESS REPORT/RE-CERT
Outpatient Physical Therapy Ortho Re-Assessment  [facility]     Patient Name: Julia Howard  : 1965  MRN: [id number]  Today's Date: 2017      Visit Date: 2017    There is no problem list on file for this patient.       No past medical history on file.     Past Surgical History:   Procedure Laterality Date   • HYSTERECTOMY  2017       Visit Dx:     ICD-10-CM ICD-9-CM   1. Acute pain of left shoulder M25.512 719.41                 PT Ortho       17 1600    Posture/Observations    Posture/Observations Comments fwd head posture with mild rounded shoulders.  -ALFREDO    Shoulder Impingement/Rotator Cuff Special Tests    Goldberg-Gamaliel Test (RC Lesion vs. Bursitis) Negative  -ALFREDO    Neer Impingement Test (RC Lesion vs. Bursitis) Negative  -ALFREDO    Full Can Test (RC Lesion) Negative  -ALFREDO    Left Shoulder    Flexion Gross Movement (4+/5) good plus  -ALRFEDO    Abduction Middle Deltoid (4+/5) good plus  -ALFREDO    Abduction Supraspinatus (4+/5) good plus  -ALFREDO    Int Rotation Gross Movement (5/5) normal  -ALFREDO    Ext Rotation Gross Movement (4+/5) good plus  -ALFREDO    External Rotation Infraspinatus (4+/5) good plus  -ALFREDO    Left Scapula    Scapular ADduction Middle Trapezius (4+/5) good plus  -ALFREDO    Scapular ADd&Depression Lwr Trapezius (4/5) good  -ALFREDO      User Key  (r) = Recorded By, (t) = Taken By, (c) = Cosigned By    Initials Name Provider Type    ALFREDO Siu, PT Physical Therapist                                  PT OP Goals       17 1600       PT Short Term Goals    STG Date to Achieve 17  -ALFREDO     STG 1 Patient to maintain pn 0/10 pre treatment  -ALFREDO     STG 1 Progress Met  -ALFREDO     STG 2 Patient to acheive hand hebhind head to C7 with minimal pain  -ALFREDO     STG 2 Progress Met  -ALFREDO     Long Term Goals    LTG Date to Achieve 17  -ALFREDO     LTG 1 Patient to be independent with final HEP  -ALFREDO     LTG 1 Progress Met  -ALFREDO     LTG 2 Patient to demonstrate middle trap strength at least 4+/5   -ALFREDO     LTG 2 Progress Partially Met  -ALFREDO     LTG 3 Patient to demonstrate pain free ABD to at least 125  -ALFREDO     LTG 3 Progress Met  -ALFREDO     LTG 4 Patient to demonstrate pain free flexion to at least 125  -ALFREDO     LTG 4 Progress Met  -ALFREDO     LTG 5 Patient to improve Quick Dash to at least 16%  -ALFREDO     LTG 5 Progress Met  -ALFREDO       User Key  (r) = Recorded By, (t) = Taken By, (c) = Cosigned By    Initials Name Provider Type    ALFREDO Siu, PT Physical Therapist                PT Assessment/Plan       12/21/17 1600       PT Assessment    Assessment Comments At this time the patient has met all of her goals and is appropriate to transition into I HEP.  -ALFREDO     Please refer to paper survey for additional self-reported information Yes  -ALFREDO     Rehab Potential Good  -ALFREDO     Patient/caregiver participated in establishment of treatment plan and goals Yes  -ALFREDO     Patient would benefit from skilled therapy intervention Yes  -ALFREDO     PT Plan    Predicted Duration of Therapy Intervention (days/wks) 6 visits  -ALFREDO     Planned CPT's? PT THER PROC EA 15 MIN: 89684;PT MANUAL THERAPY EA 15 MIN: 55649;PT ELECTRICAL STIM UNATTEND: ;PT ULTRASOUND EA 15 MIN: 05584;PT HOT/COLD PACK WC NONMCARE: 70754  -ALFREDO     PT Plan Comments patient to follow up prn over the next 3 weeks if pain returns, otherwise she will discharge to I HEP.  -ALFREDO       User Key  (r) = Recorded By, (t) = Taken By, (c) = Cosigned By    Initials Name Provider Type    ALFREDO Siu, PT Physical Therapist                  Exercises       12/21/17 1600          Subjective Comments    Subjective Comments She has been doing great.  She has performed almost all normal activiities without pain  -ALFREDO      Subjective Pain    Able to rate subjective pain? yes  -ALFREDO      Pre-Treatment Pain Level 0  -ALFREDO      Post-Treatment Pain Level 0  -ALFREDO      Exercise 1    Exercise Name 1 UBE 1.0  -ALFREDO      Time (Minutes) 1 4  -ALFREDO      Exercise 2    Exercise Name 2 low trap slides with  red band  -ALFREDO      Sets 2 2  -ALFREDO      Reps 2 10  -ALFREDO      Exercise 3    Exercise Name 3 shoulder abd in ER with red band  -ALFREDO      Sets 3 2  -ALFREDO      Reps 3 10  -ALFREDO      Exercise 4    Exercise Name 4 no money green band  -ALFREDO      Sets 4 2  -ALFREDO      Reps 4 10  -ALFREDO        User Key  (r) = Recorded By, (t) = Taken By, (c) = Cosigned By    Initials Name Provider Type    ALFREDO Siu, PT Physical Therapist                                  Time Calculation:   Start Time: 1623  Total Timed Code Minutes- PT: 25 minute(s)     Therapy Charges for Today     Code Description Service Date Service Provider Modifiers Qty    [id number]  PT THER PROC EA 15 MIN 12/21/2017 Yasmani Siu, PT GP 2                    Yasmani Siu, PT  12/21/2017

## 2018-01-15 ENCOUNTER — OFFICE VISIT (OUTPATIENT)
Dept: ORTHOPEDIC SURGERY | Facility: CLINIC | Age: 53
End: 2018-01-15

## 2018-01-15 VITALS
WEIGHT: 235.89 LBS | HEART RATE: 84 BPM | BODY MASS INDEX: 37.91 KG/M2 | DIASTOLIC BLOOD PRESSURE: 80 MMHG | HEIGHT: 66 IN | SYSTOLIC BLOOD PRESSURE: 131 MMHG

## 2018-01-15 DIAGNOSIS — IMO0002 ROTATOR CUFF SYNDROME OF LEFT SHOULDER AND ALLIED DISORDERS: Primary | ICD-10-CM

## 2018-01-15 PROCEDURE — 99212 OFFICE O/P EST SF 10 MIN: CPT | Performed by: ORTHOPAEDIC SURGERY

## 2018-03-01 ENCOUNTER — DOCUMENTATION (OUTPATIENT)
Dept: PHYSICAL THERAPY | Facility: HOSPITAL | Age: 53
End: 2018-03-01

## 2018-03-01 DIAGNOSIS — M25.512 ACUTE PAIN OF LEFT SHOULDER: Primary | ICD-10-CM

## 2018-03-01 NOTE — THERAPY DISCHARGE NOTE
Outpatient Physical Therapy Discharge Summary         Patient Name: Julia Howard  : 1965  MRN: [id number]    Today's Date: 3/1/2018    Visit Dx:    ICD-10-CM ICD-9-CM   1. Acute pain of left shoulder M25.512 719.41             PT OP Goals       18 1300       PT Short Term Goals    STG Date to Achieve 17  -ALFREDO     STG 1 Patient to maintain pn 0/10 pre treatment  -ALFREDO     STG 1 Progress Met  -ALFREDO     STG 2 Patient to acheive hand hebhind head to C7 with minimal pain  -ALFREDO     STG 2 Progress Met  -ALFREDO     Long Term Goals    LTG Date to Achieve 17  -ALFREDO     LTG 1 Patient to be independent with final HEP  -ALFREDO     LTG 1 Progress Met  -ALFREDO     LTG 2 Patient to demonstrate middle trap strength at least 4+/5  -ALFREDO     LTG 2 Progress Partially Met  -ALFREDO     LTG 3 Patient to demonstrate pain free ABD to at least 125  -ALFREDO     LTG 3 Progress Met  -ALFREDO     LTG 4 Patient to demonstrate pain free flexion to at least 125  -ALFREDO     LTG 4 Progress Met  -ALFREDO     LTG 5 Patient to improve Quick Dash to at least 16%  -ALFREDO     LTG 5 Progress Met  -ALFREDO       User Key  (r) = Recorded By, (t) = Taken By, (c) = Cosigned By    Initials Name Provider Type    ALFREDO Siu, PT Physical Therapist          OP PT Discharge Summary  Date of Discharge: 18  Reason for Discharge: All goals achieved, Independent  Outcomes Achieved: Able to achieve all goals within established timeline  Discharge Destination: Home with home program  Discharge Instructions: 5/5 visits all goals met      5/5 visits attended      Yasmani Siu, PT  3/1/2018

## 2019-04-03 ENCOUNTER — TRANSCRIBE ORDERS (OUTPATIENT)
Dept: ADMINISTRATIVE | Facility: HOSPITAL | Age: 54
End: 2019-04-03

## 2019-04-03 DIAGNOSIS — Z12.31 VISIT FOR SCREENING MAMMOGRAM: Primary | ICD-10-CM

## 2019-05-13 ENCOUNTER — APPOINTMENT (OUTPATIENT)
Dept: OTHER | Facility: HOSPITAL | Age: 54
End: 2019-05-13

## 2019-05-13 ENCOUNTER — HOSPITAL ENCOUNTER (OUTPATIENT)
Dept: MAMMOGRAPHY | Facility: HOSPITAL | Age: 54
Discharge: HOME | End: 2019-05-13
Admitting: FAMILY MEDICINE

## 2019-05-13 DIAGNOSIS — Z12.31 VISIT FOR SCREENING MAMMOGRAM: ICD-10-CM

## 2019-05-13 DIAGNOSIS — Z92.89 H/O MAMMOGRAM: ICD-10-CM

## 2019-05-13 PROCEDURE — 77063 BREAST TOMOSYNTHESIS BI: CPT

## 2019-05-13 PROCEDURE — 77067 SCR MAMMO BI INCL CAD: CPT

## 2019-05-13 PROCEDURE — 77067 SCR MAMMO BI INCL CAD: CPT | Performed by: RADIOLOGY

## 2019-05-13 PROCEDURE — 77063 BREAST TOMOSYNTHESIS BI: CPT | Performed by: RADIOLOGY

## 2019-10-10 ENCOUNTER — HOSPITAL ENCOUNTER (OUTPATIENT)
Dept: GENERAL RADIOLOGY | Facility: HOSPITAL | Age: 54
Discharge: HOME | End: 2019-10-10
Admitting: FAMILY MEDICINE

## 2019-10-10 ENCOUNTER — TRANSCRIBE ORDERS (OUTPATIENT)
Dept: ADMINISTRATIVE | Facility: HOSPITAL | Age: 54
End: 2019-10-10

## 2019-10-10 DIAGNOSIS — Z01.811 PRE-OP CHEST EXAM: Primary | ICD-10-CM

## 2019-10-10 DIAGNOSIS — Z01.811 PRE-OP CHEST EXAM: ICD-10-CM

## 2019-10-10 PROCEDURE — 71046 X-RAY EXAM CHEST 2 VIEWS: CPT

## 2020-05-18 ENCOUNTER — TRANSCRIBE ORDERS (OUTPATIENT)
Dept: ADMINISTRATIVE | Facility: HOSPITAL | Age: 55
End: 2020-05-18

## 2020-05-18 DIAGNOSIS — Z12.31 VISIT FOR SCREENING MAMMOGRAM: Primary | ICD-10-CM

## 2020-07-07 ENCOUNTER — HOSPITAL ENCOUNTER (OUTPATIENT)
Dept: MAMMOGRAPHY | Facility: HOSPITAL | Age: 55
Discharge: HOME | End: 2020-07-07
Admitting: NURSE PRACTITIONER

## 2020-07-07 DIAGNOSIS — Z12.31 VISIT FOR SCREENING MAMMOGRAM: ICD-10-CM

## 2020-07-07 PROCEDURE — 77067 SCR MAMMO BI INCL CAD: CPT | Performed by: RADIOLOGY

## 2020-07-07 PROCEDURE — 77063 BREAST TOMOSYNTHESIS BI: CPT | Performed by: RADIOLOGY

## 2020-07-07 PROCEDURE — 77063 BREAST TOMOSYNTHESIS BI: CPT

## 2020-07-07 PROCEDURE — 77067 SCR MAMMO BI INCL CAD: CPT

## 2021-06-16 ENCOUNTER — TRANSCRIBE ORDERS (OUTPATIENT)
Dept: ADMINISTRATIVE | Facility: HOSPITAL | Age: 56
End: 2021-06-16

## 2021-06-16 DIAGNOSIS — Z12.31 VISIT FOR SCREENING MAMMOGRAM: Primary | ICD-10-CM

## 2021-07-28 ENCOUNTER — HOSPITAL ENCOUNTER (OUTPATIENT)
Dept: MAMMOGRAPHY | Facility: HOSPITAL | Age: 56
Discharge: HOME | End: 2021-07-28
Admitting: INTERNAL MEDICINE

## 2021-07-28 DIAGNOSIS — Z12.31 VISIT FOR SCREENING MAMMOGRAM: ICD-10-CM

## 2021-07-28 PROCEDURE — 77063 BREAST TOMOSYNTHESIS BI: CPT

## 2021-07-28 PROCEDURE — 77067 SCR MAMMO BI INCL CAD: CPT

## 2021-07-28 PROCEDURE — 77067 SCR MAMMO BI INCL CAD: CPT | Performed by: RADIOLOGY

## 2021-07-28 PROCEDURE — 77063 BREAST TOMOSYNTHESIS BI: CPT | Performed by: RADIOLOGY

## 2021-08-12 ENCOUNTER — HOSPITAL ENCOUNTER (OUTPATIENT)
Dept: ULTRASOUND IMAGING | Facility: HOSPITAL | Age: 56
Discharge: HOME | End: 2021-08-12

## 2021-08-12 ENCOUNTER — TRANSCRIBE ORDERS (OUTPATIENT)
Dept: MAMMOGRAPHY | Facility: HOSPITAL | Age: 56
End: 2021-08-12

## 2021-08-12 ENCOUNTER — HOSPITAL ENCOUNTER (OUTPATIENT)
Dept: MAMMOGRAPHY | Facility: HOSPITAL | Age: 56
Discharge: HOME | End: 2021-08-12

## 2021-08-12 DIAGNOSIS — R92.8 ABNORMAL MAMMOGRAM: Primary | ICD-10-CM

## 2021-08-12 DIAGNOSIS — R92.8 ABNORMAL MAMMOGRAM: ICD-10-CM

## 2021-08-12 PROCEDURE — 76642 ULTRASOUND BREAST LIMITED: CPT

## 2021-08-12 PROCEDURE — 76642 ULTRASOUND BREAST LIMITED: CPT | Performed by: RADIOLOGY

## 2021-08-12 PROCEDURE — G0279 TOMOSYNTHESIS, MAMMO: HCPCS

## 2021-08-12 PROCEDURE — 77065 DX MAMMO INCL CAD UNI: CPT | Performed by: RADIOLOGY

## 2021-08-12 PROCEDURE — 77061 BREAST TOMOSYNTHESIS UNI: CPT | Performed by: RADIOLOGY

## 2021-08-12 PROCEDURE — 77065 DX MAMMO INCL CAD UNI: CPT

## 2022-02-07 ENCOUNTER — HOSPITAL ENCOUNTER (OUTPATIENT)
Dept: MAMMOGRAPHY | Facility: HOSPITAL | Age: 57
Discharge: HOME | End: 2022-02-07
Admitting: INTERNAL MEDICINE

## 2022-02-07 DIAGNOSIS — R92.8 ABNORMAL MAMMOGRAM: ICD-10-CM

## 2022-02-07 PROCEDURE — G0279 TOMOSYNTHESIS, MAMMO: HCPCS

## 2022-02-07 PROCEDURE — 77061 BREAST TOMOSYNTHESIS UNI: CPT | Performed by: RADIOLOGY

## 2022-02-07 PROCEDURE — 77065 DX MAMMO INCL CAD UNI: CPT | Performed by: RADIOLOGY

## 2022-02-07 PROCEDURE — 77065 DX MAMMO INCL CAD UNI: CPT

## 2022-06-29 ENCOUNTER — TRANSCRIBE ORDERS (OUTPATIENT)
Dept: ADMINISTRATIVE | Facility: HOSPITAL | Age: 57
End: 2022-06-29

## 2022-06-29 DIAGNOSIS — Z12.31 SCREENING MAMMOGRAM FOR BREAST CANCER: Primary | ICD-10-CM

## 2022-08-12 ENCOUNTER — HOSPITAL ENCOUNTER (OUTPATIENT)
Dept: MAMMOGRAPHY | Facility: HOSPITAL | Age: 57
Discharge: HOME | End: 2022-08-12
Admitting: INTERNAL MEDICINE

## 2022-08-12 DIAGNOSIS — Z12.31 SCREENING MAMMOGRAM FOR BREAST CANCER: ICD-10-CM

## 2022-08-12 PROCEDURE — 77063 BREAST TOMOSYNTHESIS BI: CPT

## 2022-08-12 PROCEDURE — 77063 BREAST TOMOSYNTHESIS BI: CPT | Performed by: RADIOLOGY

## 2022-08-12 PROCEDURE — 77067 SCR MAMMO BI INCL CAD: CPT

## 2022-08-12 PROCEDURE — 77067 SCR MAMMO BI INCL CAD: CPT | Performed by: RADIOLOGY

## 2023-08-22 ENCOUNTER — HOSPITAL ENCOUNTER (OUTPATIENT)
Dept: MAMMOGRAPHY | Facility: HOSPITAL | Age: 58
Discharge: HOME | End: 2023-08-22
Admitting: INTERNAL MEDICINE
Payer: COMMERCIAL

## 2023-08-22 DIAGNOSIS — Z12.31 VISIT FOR SCREENING MAMMOGRAM: ICD-10-CM

## 2023-08-22 PROCEDURE — 77063 BREAST TOMOSYNTHESIS BI: CPT

## 2023-08-22 PROCEDURE — 77067 SCR MAMMO BI INCL CAD: CPT

## 2024-08-02 ENCOUNTER — TRANSCRIBE ORDERS (OUTPATIENT)
Dept: ADMINISTRATIVE | Facility: HOSPITAL | Age: 59
End: 2024-08-02
Payer: COMMERCIAL

## 2024-08-02 DIAGNOSIS — Z12.31 OTHER SCREENING MAMMOGRAM: Primary | ICD-10-CM

## 2024-08-14 LAB
NCCN CRITERIA FLAG: ABNORMAL
TYRER CUZICK SCORE: 16.1

## 2024-08-27 ENCOUNTER — HOSPITAL ENCOUNTER (OUTPATIENT)
Facility: HOSPITAL | Age: 59
Discharge: HOME | End: 2024-08-27
Admitting: OBSTETRICS & GYNECOLOGY
Payer: COMMERCIAL

## 2024-08-27 DIAGNOSIS — Z12.31 OTHER SCREENING MAMMOGRAM: ICD-10-CM

## 2024-08-27 PROCEDURE — 77063 BREAST TOMOSYNTHESIS BI: CPT

## 2024-08-27 PROCEDURE — 77067 SCR MAMMO BI INCL CAD: CPT

## 2024-12-06 ENCOUNTER — TRANSCRIBE ORDERS (OUTPATIENT)
Dept: ADMINISTRATIVE | Facility: HOSPITAL | Age: 59
End: 2024-12-06
Payer: COMMERCIAL

## 2024-12-06 DIAGNOSIS — Z87.898 PERSONAL HISTORY OF OTHER SPECIFIED CONDITIONS: Primary | ICD-10-CM

## 2025-05-12 ENCOUNTER — HOSPITAL ENCOUNTER (OUTPATIENT)
Dept: BONE DENSITY | Facility: HOSPITAL | Age: 60
Discharge: HOME | End: 2025-05-12
Admitting: OBSTETRICS & GYNECOLOGY
Payer: COMMERCIAL

## 2025-05-12 DIAGNOSIS — Z87.898 PERSONAL HISTORY OF OTHER SPECIFIED CONDITIONS: ICD-10-CM

## 2025-05-12 PROCEDURE — 77080 DXA BONE DENSITY AXIAL: CPT

## 2025-08-11 ENCOUNTER — TRANSCRIBE ORDERS (OUTPATIENT)
Dept: ADMINISTRATIVE | Facility: HOSPITAL | Age: 60
End: 2025-08-11
Payer: COMMERCIAL

## 2025-08-11 DIAGNOSIS — Z12.31 VISIT FOR SCREENING MAMMOGRAM: Primary | ICD-10-CM
